# Patient Record
Sex: MALE | Race: BLACK OR AFRICAN AMERICAN | Employment: OTHER | URBAN - METROPOLITAN AREA
[De-identification: names, ages, dates, MRNs, and addresses within clinical notes are randomized per-mention and may not be internally consistent; named-entity substitution may affect disease eponyms.]

---

## 2018-03-07 ENCOUNTER — APPOINTMENT (OUTPATIENT)
Dept: CT IMAGING | Facility: CLINIC | Age: 28
End: 2018-03-07
Attending: EMERGENCY MEDICINE
Payer: COMMERCIAL

## 2018-03-07 ENCOUNTER — HOSPITAL ENCOUNTER (EMERGENCY)
Facility: CLINIC | Age: 28
Discharge: HOME | End: 2018-03-07
Attending: EMERGENCY MEDICINE | Admitting: EMERGENCY MEDICINE
Payer: COMMERCIAL

## 2018-03-07 VITALS
HEART RATE: 74 BPM | TEMPERATURE: 98 F | SYSTOLIC BLOOD PRESSURE: 103 MMHG | RESPIRATION RATE: 16 BRPM | DIASTOLIC BLOOD PRESSURE: 64 MMHG | OXYGEN SATURATION: 99 %

## 2018-03-07 DIAGNOSIS — K60.2 ANAL FISSURE: ICD-10-CM

## 2018-03-07 LAB
ALBUMIN SERPL-MCNC: 4.3 G/DL (ref 3.4–5)
ALBUMIN UR-MCNC: 10 MG/DL
ALP SERPL-CCNC: 60 U/L (ref 40–150)
ALT SERPL W P-5'-P-CCNC: 15 U/L (ref 0–70)
ANION GAP SERPL CALCULATED.3IONS-SCNC: 6 MMOL/L (ref 3–14)
APPEARANCE UR: CLEAR
AST SERPL W P-5'-P-CCNC: 14 U/L (ref 0–45)
BASOPHILS # BLD AUTO: 0 10E9/L (ref 0–0.2)
BASOPHILS NFR BLD AUTO: 0.2 %
BILIRUB SERPL-MCNC: 0.5 MG/DL (ref 0.2–1.3)
BILIRUB UR QL STRIP: NEGATIVE
BUN SERPL-MCNC: 9 MG/DL (ref 7–30)
CALCIUM SERPL-MCNC: 8.7 MG/DL (ref 8.5–10.1)
CHLORIDE SERPL-SCNC: 105 MMOL/L (ref 94–109)
CO2 SERPL-SCNC: 30 MMOL/L (ref 20–32)
COLOR UR AUTO: YELLOW
CREAT BLD-MCNC: 0.9 MG/DL (ref 0.66–1.25)
CREAT SERPL-MCNC: 0.9 MG/DL (ref 0.66–1.25)
DIFFERENTIAL METHOD BLD: NORMAL
EOSINOPHIL # BLD AUTO: 0.1 10E9/L (ref 0–0.7)
EOSINOPHIL NFR BLD AUTO: 2.9 %
ERYTHROCYTE [DISTWIDTH] IN BLOOD BY AUTOMATED COUNT: 13.6 % (ref 10–15)
GFR SERPL CREATININE-BSD FRML MDRD: >90 ML/MIN/1.7M2
GFR SERPL CREATININE-BSD FRML MDRD: >90 ML/MIN/1.7M2
GLUCOSE SERPL-MCNC: 83 MG/DL (ref 70–99)
GLUCOSE UR STRIP-MCNC: NEGATIVE MG/DL
HCT VFR BLD AUTO: 43.4 % (ref 40–53)
HGB BLD-MCNC: 14.7 G/DL (ref 13.3–17.7)
HGB UR QL STRIP: NEGATIVE
IMM GRANULOCYTES # BLD: 0 10E9/L (ref 0–0.4)
IMM GRANULOCYTES NFR BLD: 0 %
INR PPP: 1.05 (ref 0.86–1.14)
KETONES UR STRIP-MCNC: NEGATIVE MG/DL
LEUKOCYTE ESTERASE UR QL STRIP: NEGATIVE
LIPASE SERPL-CCNC: 54 U/L (ref 73–393)
LYMPHOCYTES # BLD AUTO: 1.4 10E9/L (ref 0.8–5.3)
LYMPHOCYTES NFR BLD AUTO: 32.9 %
MCH RBC QN AUTO: 28.8 PG (ref 26.5–33)
MCHC RBC AUTO-ENTMCNC: 33.9 G/DL (ref 31.5–36.5)
MCV RBC AUTO: 85 FL (ref 78–100)
MONOCYTES # BLD AUTO: 0.4 10E9/L (ref 0–1.3)
MONOCYTES NFR BLD AUTO: 10 %
MUCOUS THREADS #/AREA URNS LPF: PRESENT /LPF
NEUTROPHILS # BLD AUTO: 2.2 10E9/L (ref 1.6–8.3)
NEUTROPHILS NFR BLD AUTO: 54 %
NITRATE UR QL: NEGATIVE
NRBC # BLD AUTO: 0 10*3/UL
NRBC BLD AUTO-RTO: 0 /100
PH UR STRIP: 6 PH (ref 5–7)
PLATELET # BLD AUTO: 191 10E9/L (ref 150–450)
POTASSIUM SERPL-SCNC: 3.7 MMOL/L (ref 3.4–5.3)
PROT SERPL-MCNC: 8.2 G/DL (ref 6.8–8.8)
RBC # BLD AUTO: 5.11 10E12/L (ref 4.4–5.9)
RBC #/AREA URNS AUTO: 2 /HPF (ref 0–2)
SODIUM SERPL-SCNC: 141 MMOL/L (ref 133–144)
SOURCE: ABNORMAL
SP GR UR STRIP: 1.01 (ref 1–1.03)
UROBILINOGEN UR STRIP-MCNC: NORMAL MG/DL (ref 0–2)
WBC # BLD AUTO: 4.1 10E9/L (ref 4–11)
WBC #/AREA URNS AUTO: <1 /HPF (ref 0–5)

## 2018-03-07 PROCEDURE — 25000128 H RX IP 250 OP 636: Performed by: EMERGENCY MEDICINE

## 2018-03-07 PROCEDURE — 25000125 ZZHC RX 250: Performed by: EMERGENCY MEDICINE

## 2018-03-07 PROCEDURE — 99285 EMERGENCY DEPT VISIT HI MDM: CPT | Mod: 25 | Performed by: EMERGENCY MEDICINE

## 2018-03-07 PROCEDURE — 81001 URINALYSIS AUTO W/SCOPE: CPT | Performed by: EMERGENCY MEDICINE

## 2018-03-07 PROCEDURE — 99285 EMERGENCY DEPT VISIT HI MDM: CPT | Mod: Z6 | Performed by: EMERGENCY MEDICINE

## 2018-03-07 PROCEDURE — 82565 ASSAY OF CREATININE: CPT | Mod: 91

## 2018-03-07 PROCEDURE — 74177 CT ABD & PELVIS W/CONTRAST: CPT

## 2018-03-07 PROCEDURE — 96360 HYDRATION IV INFUSION INIT: CPT | Mod: 59 | Performed by: EMERGENCY MEDICINE

## 2018-03-07 PROCEDURE — 80053 COMPREHEN METABOLIC PANEL: CPT | Performed by: EMERGENCY MEDICINE

## 2018-03-07 PROCEDURE — 85025 COMPLETE CBC W/AUTO DIFF WBC: CPT | Performed by: EMERGENCY MEDICINE

## 2018-03-07 PROCEDURE — 83690 ASSAY OF LIPASE: CPT | Performed by: EMERGENCY MEDICINE

## 2018-03-07 PROCEDURE — 85610 PROTHROMBIN TIME: CPT | Performed by: EMERGENCY MEDICINE

## 2018-03-07 RX ORDER — IOPAMIDOL 755 MG/ML
100 INJECTION, SOLUTION INTRAVASCULAR ONCE
Status: COMPLETED | OUTPATIENT
Start: 2018-03-07 | End: 2018-03-07

## 2018-03-07 RX ORDER — NITROGLYCERIN 4 MG/G
1 OINTMENT RECTAL EVERY 12 HOURS
Qty: 30 G | Refills: 0 | Status: SHIPPED | OUTPATIENT
Start: 2018-03-07 | End: 2019-11-04

## 2018-03-07 RX ADMIN — SODIUM CHLORIDE 1000 ML: 9 INJECTION, SOLUTION INTRAVENOUS at 19:17

## 2018-03-07 RX ADMIN — IOPAMIDOL 66 ML: 755 INJECTION, SOLUTION INTRAVENOUS at 19:31

## 2018-03-07 RX ADMIN — SODIUM CHLORIDE 56 ML: 9 INJECTION, SOLUTION INTRAVENOUS at 19:31

## 2018-03-07 ASSESSMENT — ENCOUNTER SYMPTOMS
SHORTNESS OF BREATH: 0
ABDOMINAL PAIN: 1
HEADACHES: 0
BLOOD IN STOOL: 1
ARTHRALGIAS: 0
NECK STIFFNESS: 0
DIFFICULTY URINATING: 0
FEVER: 0
COLOR CHANGE: 0
CONFUSION: 0
EYE REDNESS: 0
RECTAL PAIN: 1

## 2018-03-07 NOTE — ED AVS SNAPSHOT
[facility], Emergency Department    [address]    [place] [address]    Phone:  [phone]    Fax:  [phone]                                       Scar Condon   MRN: [id number]    Department:  [facility], Emergency Department   Date of Visit:  3/7/2018           Patient Information     Date Of Birth          1990        Your diagnoses for this visit were:     Anal fissure        You were seen by Blas Cortes MD.        Discharge Instructions       Take fiber as directed.  Apply nitroglycerin ointment with a gloved finger to the anal region as directed.    Please make an appointment to follow up with [facility] (phone: [phone])      Discharge References/Attachments     KRIS BATH, TAKING A (ENGLISH)      24 Hour Appointment Hotline       To make an appointment at any [facility] clinic, call [phone] ([phone]). If you don't have a family doctor or clinic, we will help you find one. [facility] clinics are conveniently located to serve the needs of you and your family.             Review of your medicines      START taking        Dose / Directions Last dose taken    methylcellulose 500 MG Tabs tablet   Commonly known as:  CITRUCEL   Dose:  500 mg   Quantity:  30 each        Take 1 tablet (500 mg) by mouth daily   Refills:  0        nitroGLYcerin 0.4 % Oint rectal ointment   Commonly known as:  RECTIV   Dose:  1 inch   Quantity:  30 g        Place 1 inch (1.5 mg) rectally every 12 hours   Refills:  0          Our records show that you are taking the medicines listed below. If these are incorrect, please call your family doctor or clinic.        Dose / Directions Last dose taken    omeprazole 20 MG CR capsule   Commonly known as:  priLOSEC   Dose:  20 mg   Quantity:  30 capsule        Take 1 capsule (20 mg) by mouth daily   Refills:  1        sucralfate 1 GM/10ML suspension   Commonly known as:  CARAFATE   Dose:  1 g   Quantity:  420 mL        Take 10 mLs (1  g) by mouth 4 times daily   Refills:  1        traMADol 50 MG tablet   Commonly known as:  ULTRAM   Dose:   mg   Quantity:  90 tablet        Take 1-2 tablets ( mg) by mouth every 6 hours as needed for moderate pain   Refills:  0                Prescriptions were sent or printed at these locations (2 Prescriptions)                   Other Prescriptions                Printed at Department/Unit printer (2 of 2)         methylcellulose (CITRUCEL) 500 MG TABS tablet               nitroGLYcerin (RECTIV) 0.4 % OINT rectal ointment                Procedures and tests performed during your visit     CBC with platelets differential    CT Abdomen Pelvis w Contrast    Comprehensive metabolic panel    Creatinine POCT    INR    ISTAT creatinine  POCT    Lipase    Peripheral IV catheter    UA with Microscopic reflex to Culture      Orders Needing Specimen Collection     None      Pending Results     Date and Time Order Name Status Description    3/7/2018 1905 CT Abdomen Pelvis w Contrast Preliminary             Pending Culture Results     No orders found from 3/5/2018 to 3/8/2018.            Pending Results Instructions     If you had any lab results that were not finalized at the time of your Discharge, you can call the ED Lab Result RN at [phone]. You will be contacted by this team for any positive Lab results or changes in treatment. The nurses are available 7 days a week from 10A to 6:30P.  You can leave a message 24 hours per day and they will return your call.        Thank you for choosing [facility]       Thank you for choosing [facility] for your care. Our goal is always to provide you with excellent care. Hearing back from our patients is one way we can continue to improve our services. Please take a few minutes to complete the written survey that you may receive in the mail after you visit with us. Thank you!        [employer] Information     [employer] lets you send messages to your doctor, view your test  "results, renew your prescriptions, schedule appointments and more. To sign up, go to [email or web address] . Click on \"Log in\" on the left side of the screen, which will take you to the Welcome page. Then click on \"Sign up Now\" on the right side of the page.     You will be asked to enter the access code listed below, as well as some personal information. Please follow the directions to create your username and password.     Your access code is: [email or web address]  Expires: 2018  9:23 PM     Your access code will  in 90 days. If you need help or a new code, please call your [facility] clinic or [phone].        Care EveryWhere ID     This is your Care EveryWhere ID. This could be used by other organizations to access your [facility] medical records  [id number]        Equal Access to Services     RADHA JOHNSTON : Hadant Guerra, mart bess, morris zapata, lobo ordonez . So [facility] [phone].    ATENCIÓN: Si habla español, tiene a nichole disposición servicios gratuitos de asistencia lingüística. Llame al [phone].    We comply with applicable federal civil rights laws and [place] laws. We do not discriminate on the basis of race, color, national origin, age, disability, sex, sexual orientation, or gender identity.            After Visit Summary       This is your record. Keep this with you and show to your community pharmacist(s) and doctor(s) at your next visit.                  "

## 2018-03-07 NOTE — ED AVS SNAPSHOT
[facility], [place], Emergency Department    [address]    [facility] [address]    Phone:  [phone]    Fax:  [phone]                                       Scar Condon   MRN: [id number]    Department:  [facility], Emergency Department   Date of Visit:  3/7/2018           After Visit Summary Signature Page     I have received my discharge instructions, and my questions have been answered. I have discussed any challenges I see with this plan with the nurse or doctor.    ..........................................................................................................................................  Patient/Patient Representative Signature      ..........................................................................................................................................  Patient Representative Print Name and Relationship to Patient    ..................................................               ................................................  Date                                            Time    ..........................................................................................................................................  Reviewed by Signature/Title    ...................................................              ..............................................  Date                                                            Time

## 2018-03-08 NOTE — DISCHARGE INSTRUCTIONS
Take fiber as directed.  Apply nitroglycerin ointment with a gloved finger to the anal region as directed.    Please make an appointment to follow up with [facility] (phone: [phone])

## 2018-03-08 NOTE — ED PROVIDER NOTES
History     Chief Complaint   Patient presents with     Abdominal Pain     dark blood in stool: thought had blood in stool last month: stomach pain: mid ABD pain     HPI  Scar Condon is a 27 year old male who presents to the emergency department with abdominal pain and bloody stool.  Patient states that he has had upper and mid abdominal pain for the past 2 days.  He states that it has been largely constant.  He states that it is sharp.  He did have some nausea yesterday but has not had any vomiting.  Today, the patient had a formed stool with some dark blood in it as well.  He denies constipation.  He reports that his stools have been formed.  Patient states he did have some rectal pain with his most recent bowel movement here in the emergency department.  He denies any dysuria, urgency, or frequency.  He denies fever.  Patient denies any NSAID use including ibuprofen and Aleve.  Patient denies any anticoagulant use.  The patient had previously been on omeprazole but has not been taking that for some time.  He denies a history of abdominal surgery.  Patient denies any recent antibiotic use.    I have reviewed the Medications, Allergies, Past Medical and Surgical History, and Social History in the Epic system.    Review of Systems   Constitutional: Negative for fever.   HENT: Negative for congestion.    Eyes: Negative for redness.   Respiratory: Negative for shortness of breath.    Cardiovascular: Negative for chest pain.   Gastrointestinal: Positive for abdominal pain, blood in stool and rectal pain.   Genitourinary: Negative for difficulty urinating.   Musculoskeletal: Negative for arthralgias and neck stiffness.   Skin: Negative for color change.   Neurological: Negative for headaches.   Psychiatric/Behavioral: Negative for confusion.   All other systems reviewed and are negative.      Physical Exam   BP: 103/64  Pulse: 74  Temp: 98  F (36.7  C)  Resp: 16  SpO2: 99 %      Physical Exam   Constitutional: He  appears well-developed and well-nourished. No distress.   HENT:   Mouth/Throat: No oropharyngeal exudate.   Eyes: No scleral icterus.   Cardiovascular: Normal rate, regular rhythm, normal heart sounds and intact distal pulses.    Pulmonary/Chest: Effort normal and breath sounds normal. No respiratory distress.   Abdominal: Soft. Bowel sounds are normal. There is tenderness.       Genitourinary: Rectal exam shows tenderness (anterior) and guaiac positive stool (brown stool). Rectal exam shows no external hemorrhoid, no internal hemorrhoid and no mass.   Musculoskeletal: He exhibits no edema or tenderness.   Skin: Skin is warm. No rash noted. He is not diaphoretic.   Nursing note and vitals reviewed.      ED Course     ED Course     Procedures            Critical Care time:    Results for orders placed or performed during the hospital encounter of 03/07/18   CT Abdomen Pelvis w Contrast    Narrative    CT ABDOMEN AND PELVIS WITH CONTRAST 3/7/2018 7:32 PM     HISTORY: Lower abdominal pain. Evaluate for appendicitis.    COMPARISON: None.    TECHNIQUE: Volumetric helical acquisition of CT images from the lung  bases through the symphysis pubis after the administration of 66 mL  Isovue-370 intravenous contrast. Radiation dose for this scan was  reduced using automated exposure control, adjustment of the mA and/or  kV according to patient size, or iterative reconstruction technique.    FINDINGS: The bilateral kidneys and adrenal glands, pancreas, and  spleen demonstrate no worrisome focal lesion. Retrocecal appendix  unremarkable. Three probable hemangiomas seen in the liver. Liver  otherwise unremarkable. No hydronephrosis. Unremarkable gallbladder.  There is no free fluid in the abdomen or pelvis. No free air in the  abdomen. Bone windows reveal no destructive lesions. There are no  abdominal or pelvic lymph nodes that are abnormal by size criteria.  The visualized lung bases are unremarkable. There are no dilated  loops  of small bowel or colon.      Impression    IMPRESSION: No acute process demonstrated within the abdomen and  pelvis.    KEANU FORTE MD   CBC with platelets differential   Result Value Ref Range    WBC 4.1 4.0 - 11.0 10e9/L    RBC Count 5.11 4.4 - 5.9 10e12/L    Hemoglobin 14.7 13.3 - 17.7 g/dL    Hematocrit 43.4 40.0 - 53.0 %    MCV 85 78 - 100 fl    MCH 28.8 26.5 - 33.0 pg    MCHC 33.9 31.5 - 36.5 g/dL    RDW 13.6 10.0 - 15.0 %    Platelet Count 191 150 - 450 10e9/L    Diff Method Automated Method     % Neutrophils 54.0 %    % Lymphocytes 32.9 %    % Monocytes 10.0 %    % Eosinophils 2.9 %    % Basophils 0.2 %    % Immature Granulocytes 0.0 %    Nucleated RBCs 0 0 /100    Absolute Neutrophil 2.2 1.6 - 8.3 10e9/L    Absolute Lymphocytes 1.4 0.8 - 5.3 10e9/L    Absolute Monocytes 0.4 0.0 - 1.3 10e9/L    Absolute Eosinophils 0.1 0.0 - 0.7 10e9/L    Absolute Basophils 0.0 0.0 - 0.2 10e9/L    Abs Immature Granulocytes 0.0 0 - 0.4 10e9/L    Absolute Nucleated RBC 0.0    Comprehensive metabolic panel   Result Value Ref Range    Sodium 141 133 - 144 mmol/L    Potassium 3.7 3.4 - 5.3 mmol/L    Chloride 105 94 - 109 mmol/L    Carbon Dioxide 30 20 - 32 mmol/L    Anion Gap 6 3 - 14 mmol/L    Glucose 83 70 - 99 mg/dL    Urea Nitrogen 9 7 - 30 mg/dL    Creatinine 0.90 0.66 - 1.25 mg/dL    GFR Estimate >90 >60 mL/min/1.7m2    GFR Estimate If Black >90 >60 mL/min/1.7m2    Calcium 8.7 8.5 - 10.1 mg/dL    Bilirubin Total 0.5 0.2 - 1.3 mg/dL    Albumin 4.3 3.4 - 5.0 g/dL    Protein Total 8.2 6.8 - 8.8 g/dL    Alkaline Phosphatase 60 40 - 150 U/L    ALT 15 0 - 70 U/L    AST 14 0 - 45 U/L   Lipase   Result Value Ref Range    Lipase 54 (L) 73 - 393 U/L   INR   Result Value Ref Range    INR 1.05 0.86 - 1.14   UA with Microscopic reflex to Culture   Result Value Ref Range    Color Urine Yellow     Appearance Urine Clear     Glucose Urine Negative NEG^Negative mg/dL    Bilirubin Urine Negative NEG^Negative    Ketones Urine  Negative NEG^Negative mg/dL    Specific Gravity Urine 1.015 1.003 - 1.035    Blood Urine Negative NEG^Negative    pH Urine 6.0 5.0 - 7.0 pH    Protein Albumin Urine 10 (A) NEG^Negative mg/dL    Urobilinogen mg/dL Normal 0.0 - 2.0 mg/dL    Nitrite Urine Negative NEG^Negative    Leukocyte Esterase Urine Negative NEG^Negative    Source Urine     WBC Urine <1 0 - 5 /HPF    RBC Urine 2 0 - 2 /HPF    Mucous Urine Present (A) NEG^Negative /LPF   Creatinine POCT   Result Value Ref Range    Creatinine 0.9 0.66 - 1.25 mg/dL    GFR Estimate >90 >60 mL/min/1.7m2    GFR Estimate If Black >90 >60 mL/min/1.7m2         Medications   0.9% sodium chloride BOLUS (0 mLs Intravenous Stopped 3/7/18 2044)   iopamidol (ISOVUE-370) solution 100 mL (66 mLs Intravenous Given 3/7/18 1931)   sodium chloride 0.9 % bag 500mL for CT scan flush use (56 mLs As instructed Given 3/7/18 1931)             Assessments & Plan (with Medical Decision Making)   27 year old male to the emergency department with rectal bleeding as well as rectal pain.  Also complains of lower abdominal pain.  The patient has normal vital signs.  He has lower abdominal tenderness on exam without peritonitis.  The patient has guaiac positive stool without gross blood.  Laboratory evaluation was unremarkable.  CT the abdomen/pelvis is also normal.  He does have tenderness to palpation anteriorly of his anal sphincter.  I suspect his symptoms are related to an anal fissure.  He does not have any palpable swelling or fluctuance to suggest abscess.  Patient will be discharged home with Citrucel as well as nitroglycerin ointment.  Colorectal surgery follow-up recommended.    I have reviewed the nursing notes.    I have reviewed the findings, diagnosis, plan and need for follow up with the patient.    Discharge Medication List as of 3/7/2018  9:23 PM      START taking these medications    Details   methylcellulose (CITRUCEL) 500 MG TABS tablet Take 1 tablet (500 mg) by mouth daily,  Disp-30 each, R-0, Local Print      nitroGLYcerin (RECTIV) 0.4 % OINT rectal ointment Place 1 inch (1.5 mg) rectally every 12 hours, Disp-30 g, R-0, Local Print             Final diagnoses:   Anal fissure       3/7/2018   [facility], [place], EMERGENCY DEPARTMEN     Blas Cortes MD  03/07/18 9329

## 2019-02-21 ENCOUNTER — APPOINTMENT (OUTPATIENT)
Dept: ULTRASOUND IMAGING | Facility: CLINIC | Age: 29
End: 2019-02-21
Attending: EMERGENCY MEDICINE
Payer: COMMERCIAL

## 2019-02-21 ENCOUNTER — HOSPITAL ENCOUNTER (EMERGENCY)
Facility: CLINIC | Age: 29
Discharge: HOME | End: 2019-02-22
Attending: EMERGENCY MEDICINE | Admitting: EMERGENCY MEDICINE
Payer: COMMERCIAL

## 2019-02-21 DIAGNOSIS — N45.1 RIGHT EPIDIDYMITIS: ICD-10-CM

## 2019-02-21 LAB
ALBUMIN UR-MCNC: 10 MG/DL
ANION GAP SERPL CALCULATED.3IONS-SCNC: 8 MMOL/L (ref 3–14)
APPEARANCE UR: CLEAR
BASOPHILS # BLD AUTO: 0 10E9/L (ref 0–0.2)
BASOPHILS NFR BLD AUTO: 0.6 %
BILIRUB UR QL STRIP: NEGATIVE
BUN SERPL-MCNC: 18 MG/DL (ref 7–30)
CALCIUM SERPL-MCNC: 8.4 MG/DL (ref 8.5–10.1)
CHLORIDE SERPL-SCNC: 107 MMOL/L (ref 94–109)
CO2 SERPL-SCNC: 26 MMOL/L (ref 20–32)
COLOR UR AUTO: YELLOW
CREAT SERPL-MCNC: 0.89 MG/DL (ref 0.66–1.25)
DIFFERENTIAL METHOD BLD: ABNORMAL
EOSINOPHIL # BLD AUTO: 0.1 10E9/L (ref 0–0.7)
EOSINOPHIL NFR BLD AUTO: 3.1 %
ERYTHROCYTE [DISTWIDTH] IN BLOOD BY AUTOMATED COUNT: 13.6 % (ref 10–15)
GFR SERPL CREATININE-BSD FRML MDRD: >90 ML/MIN/{1.73_M2}
GLUCOSE SERPL-MCNC: 88 MG/DL (ref 70–99)
GLUCOSE UR STRIP-MCNC: NEGATIVE MG/DL
HCT VFR BLD AUTO: 41.9 % (ref 40–53)
HGB BLD-MCNC: 13.8 G/DL (ref 13.3–17.7)
HGB UR QL STRIP: NEGATIVE
IMM GRANULOCYTES # BLD: 0 10E9/L (ref 0–0.4)
IMM GRANULOCYTES NFR BLD: 0 %
KETONES UR STRIP-MCNC: NEGATIVE MG/DL
LEUKOCYTE ESTERASE UR QL STRIP: NEGATIVE
LYMPHOCYTES # BLD AUTO: 1.6 10E9/L (ref 0.8–5.3)
LYMPHOCYTES NFR BLD AUTO: 51.1 %
MCH RBC QN AUTO: 28.6 PG (ref 26.5–33)
MCHC RBC AUTO-ENTMCNC: 32.9 G/DL (ref 31.5–36.5)
MCV RBC AUTO: 87 FL (ref 78–100)
MONOCYTES # BLD AUTO: 0.5 10E9/L (ref 0–1.3)
MONOCYTES NFR BLD AUTO: 16 %
MUCOUS THREADS #/AREA URNS LPF: PRESENT /LPF
NEUTROPHILS # BLD AUTO: 0.9 10E9/L (ref 1.6–8.3)
NEUTROPHILS NFR BLD AUTO: 29.2 %
NITRATE UR QL: NEGATIVE
NRBC # BLD AUTO: 0 10*3/UL
NRBC BLD AUTO-RTO: 0 /100
PH UR STRIP: 6.5 PH (ref 5–7)
PLATELET # BLD AUTO: 180 10E9/L (ref 150–450)
POTASSIUM SERPL-SCNC: 3.9 MMOL/L (ref 3.4–5.3)
RBC # BLD AUTO: 4.82 10E12/L (ref 4.4–5.9)
RBC #/AREA URNS AUTO: 1 /HPF (ref 0–2)
SODIUM SERPL-SCNC: 141 MMOL/L (ref 133–144)
SOURCE: ABNORMAL
SP GR UR STRIP: 1.03 (ref 1–1.03)
UROBILINOGEN UR STRIP-MCNC: 2 MG/DL (ref 0–2)
WBC # BLD AUTO: 3.2 10E9/L (ref 4–11)
WBC #/AREA URNS AUTO: 1 /HPF (ref 0–5)

## 2019-02-21 PROCEDURE — 25000132 ZZH RX MED GY IP 250 OP 250 PS 637: Performed by: EMERGENCY MEDICINE

## 2019-02-21 PROCEDURE — 96372 THER/PROPH/DIAG INJ SC/IM: CPT | Performed by: EMERGENCY MEDICINE

## 2019-02-21 PROCEDURE — 99284 EMERGENCY DEPT VISIT MOD MDM: CPT | Mod: Z6 | Performed by: EMERGENCY MEDICINE

## 2019-02-21 PROCEDURE — 81001 URINALYSIS AUTO W/SCOPE: CPT | Performed by: EMERGENCY MEDICINE

## 2019-02-21 PROCEDURE — 87591 N.GONORRHOEAE DNA AMP PROB: CPT | Performed by: EMERGENCY MEDICINE

## 2019-02-21 PROCEDURE — 80048 BASIC METABOLIC PNL TOTAL CA: CPT | Performed by: EMERGENCY MEDICINE

## 2019-02-21 PROCEDURE — 87491 CHLMYD TRACH DNA AMP PROBE: CPT | Performed by: EMERGENCY MEDICINE

## 2019-02-21 PROCEDURE — 99284 EMERGENCY DEPT VISIT MOD MDM: CPT | Mod: 25 | Performed by: EMERGENCY MEDICINE

## 2019-02-21 PROCEDURE — 93976 VASCULAR STUDY: CPT

## 2019-02-21 PROCEDURE — 85025 COMPLETE CBC W/AUTO DIFF WBC: CPT | Performed by: EMERGENCY MEDICINE

## 2019-02-21 RX ORDER — OXYCODONE HYDROCHLORIDE 5 MG/1
5 TABLET ORAL ONCE
Status: COMPLETED | OUTPATIENT
Start: 2019-02-21 | End: 2019-02-21

## 2019-02-21 RX ADMIN — OXYCODONE HYDROCHLORIDE 5 MG: 5 TABLET ORAL at 23:27

## 2019-02-21 ASSESSMENT — ENCOUNTER SYMPTOMS
NAUSEA: 0
VOMITING: 0
BACK PAIN: 1
ABDOMINAL PAIN: 0
CONSTIPATION: 1
WOUND: 0
FREQUENCY: 0
HEMATURIA: 0
DYSURIA: 0
BLOOD IN STOOL: 0
FEVER: 0
DIARRHEA: 0
FLANK PAIN: 0
DIFFICULTY URINATING: 0

## 2019-02-21 NOTE — ED AVS SNAPSHOT
[facility], [place], Emergency Department  [address]  [facility] [address]  Phone:  [phone]  Fax:  [phone]                                    Scar Condon   MRN: [id number]    Department:  [facility], Emergency Department   Date of Visit:  2/21/2019           After Visit Summary Signature Page    I have received my discharge instructions, and my questions have been answered. I have discussed any challenges I see with this plan with the nurse or doctor.    ..........................................................................................................................................  Patient/Patient Representative Signature      ..........................................................................................................................................  Patient Representative Print Name and Relationship to Patient    ..................................................               ................................................  Date                                   Time    ..........................................................................................................................................  Reviewed by Signature/Title    ...................................................              ..............................................  Date                                               Time          22EPIC Rev 08/18

## 2019-02-22 VITALS
DIASTOLIC BLOOD PRESSURE: 73 MMHG | RESPIRATION RATE: 16 BRPM | SYSTOLIC BLOOD PRESSURE: 119 MMHG | TEMPERATURE: 97.6 F | OXYGEN SATURATION: 99 % | BODY MASS INDEX: 20.07 KG/M2 | WEIGHT: 132 LBS | HEART RATE: 65 BPM

## 2019-02-22 LAB
C TRACH DNA SPEC QL NAA+PROBE: NEGATIVE
N GONORRHOEA DNA SPEC QL NAA+PROBE: NEGATIVE
SPECIMEN SOURCE: NORMAL
SPECIMEN SOURCE: NORMAL

## 2019-02-22 PROCEDURE — 25000132 ZZH RX MED GY IP 250 OP 250 PS 637: Performed by: EMERGENCY MEDICINE

## 2019-02-22 PROCEDURE — 25000128 H RX IP 250 OP 636: Performed by: EMERGENCY MEDICINE

## 2019-02-22 RX ORDER — LEVOFLOXACIN 500 MG/1
500 TABLET, FILM COATED ORAL DAILY
Qty: 10 TABLET | Refills: 0 | Status: SHIPPED | OUTPATIENT
Start: 2019-02-22 | End: 2019-03-04

## 2019-02-22 RX ORDER — OXYCODONE HYDROCHLORIDE 5 MG/1
5 TABLET ORAL EVERY 6 HOURS PRN
Qty: 5 TABLET | Refills: 0 | Status: SHIPPED | OUTPATIENT
Start: 2019-02-22 | End: 2019-02-25

## 2019-02-22 RX ORDER — CEFTRIAXONE SODIUM 250 MG
250 VIAL (EA) INJECTION ONCE
Status: COMPLETED | OUTPATIENT
Start: 2019-02-22 | End: 2019-02-22

## 2019-02-22 RX ORDER — LEVOFLOXACIN 500 MG/1
500 TABLET, FILM COATED ORAL ONCE
Status: COMPLETED | OUTPATIENT
Start: 2019-02-22 | End: 2019-02-22

## 2019-02-22 RX ADMIN — CEFTRIAXONE SODIUM 250 MG: 250 INJECTION, POWDER, FOR SOLUTION INTRAMUSCULAR; INTRAVENOUS at 00:34

## 2019-02-22 RX ADMIN — LEVOFLOXACIN 500 MG: 500 TABLET, FILM COATED ORAL at 00:34

## 2019-02-22 NOTE — ED NOTES
Patient states having scrotal pain for the past week. Patient states having a vasectomy a year ago and states he has had pain off and on but states the past week has been worse. Patient states no discharge and states having no pain with urination.

## 2019-02-22 NOTE — ED PROVIDER NOTES
History     Chief Complaint   Patient presents with     Testicular/scrotal Pain     Onset 4 days with testicular pain similar to pain after vestectomy one year ago.     GINI Condon is a 28 year old male who is status post vasectomy presents to the Emergency Department today for evaluation of right testicular pain. The patient states that on Monday, 3 days ago, he developed pain in his right testicle. This pain is noted to be constant and is exacerbated with movement and sitting down. The pain also radiates into the lower right quadrant of his abdomen. He also states that it feels like his scrotum is slightly swollen. The patient denies having any genital sores, dysuria, or penile discharge. No fever, nausea, vomiting, diarrhea, constipation, back pain.  He is sexually active with one female partner, his wife.  He is not concerned about potential STDs.  Reports he had similar pain after heavy lifting following his vasectomy.  However, this involved his whole scrotum.      I have reviewed the Medications, Allergies, Past Medical and Surgical History, and Social History in the Epic system.    History reviewed. No pertinent past medical history.    History reviewed. No pertinent surgical history.    Family History   Problem Relation Age of Onset     Cancer Maternal Uncle      Cancer Maternal Aunt      Social History     Tobacco Use     Smoking status: Current Every Day Smoker     Types: Cigarettes     Smokeless tobacco: Never Used   Substance Use Topics     Alcohol use: Yes     No current facility-administered medications for this encounter.      Current Outpatient Medications   Medication     levofloxacin (LEVAQUIN) 500 MG tablet     oxyCODONE (ROXICODONE) 5 MG tablet     methylcellulose (CITRUCEL) 500 MG TABS tablet     nitroGLYcerin (RECTIV) 0.4 % OINT rectal ointment     omeprazole (PRILOSEC) 20 MG capsule     sucralfate (CARAFATE) 1 GM/10ML suspension     traMADol (ULTRAM) 50 MG tablet     Allergies    Allergen Reactions     Codeine Hives     Naproxen      Pt states that it made his stomach feel different, numbness and tingling.       Review of Systems   Constitutional: Negative for fever.   Gastrointestinal: Positive for constipation (chronic unchanged). Negative for abdominal pain (RLQ radiating from right testicle), blood in stool, diarrhea, nausea and vomiting.   Genitourinary: Positive for scrotal swelling and testicular pain (right). Negative for decreased urine volume, difficulty urinating, discharge, dysuria, flank pain, frequency, genital sores, hematuria, penile pain and penile swelling.   Musculoskeletal: Positive for back pain (chronic unchanged).   Skin: Negative for rash and wound.   All other systems reviewed and are negative.    Physical Exam   BP: 117/78  Pulse: 70  Heart Rate: 70  Temp: 97.4  F (36.3  C)  Resp: 16  Weight: 59.9 kg (132 lb)  SpO2: 100 %    Physical Exam   Constitutional: He is oriented to person, place, and time. He appears well-developed and well-nourished. No distress.   HENT:   Head: Normocephalic and atraumatic.   Nose: Nose normal.   Mouth/Throat: Oropharynx is clear and moist.   Eyes: Conjunctivae are normal. No scleral icterus.   Neck: Normal range of motion. Neck supple.   Cardiovascular: Normal rate.   Pulmonary/Chest: Effort normal. No respiratory distress.   Abdominal: Soft. He exhibits no distension and no mass. There is no tenderness. There is no rebound and no guarding. No hernia. Hernia confirmed negative in the right inguinal area and confirmed negative in the left inguinal area.   Genitourinary: Penis normal. Right testis shows tenderness (over epididymis). Right testis shows no mass and no swelling. Right testis is descended. Left testis shows no mass, no swelling and no tenderness. Left testis is descended. Circumcised. No penile erythema or penile tenderness. No discharge found.   Musculoskeletal: Normal range of motion. He exhibits no edema or deformity.    Lymphadenopathy: No inguinal adenopathy noted on the right or left side.   Neurological: He is alert and oriented to person, place, and time. He exhibits normal muscle tone.   Skin: Skin is warm and dry. No rash noted. He is not diaphoretic. No erythema. No pallor.   Psychiatric: He has a normal mood and affect. His behavior is normal. Thought content normal.   Nursing note and vitals reviewed.      ED Course   10:35 PM  The patient was seen and examined by Dr. Bailey in Room 20.       Procedures             Critical Care time:  none             Labs Ordered and Resulted from Time of ED Arrival Up to the Time of Departure from the ED   ROUTINE UA WITH MICROSCOPIC REFLEX TO CULTURE - Abnormal; Notable for the following components:       Result Value    Protein Albumin Urine 10 (*)     Mucous Urine Present (*)     All other components within normal limits   CBC WITH PLATELETS DIFFERENTIAL - Abnormal; Notable for the following components:    WBC 3.2 (*)     Absolute Neutrophil 0.9 (*)     All other components within normal limits   BASIC METABOLIC PANEL - Abnormal; Notable for the following components:    Calcium 8.4 (*)     All other components within normal limits   CHLAMYDIA TRACHOMATIS PCR   NEISSERIA GONORRHOEAE PCR            Assessments & Plan (with Medical Decision Making)   Scar Condon is a 28 year old male who is status post vasectomy presents to the Emergency Department today for evaluation of right testicular pain.     Ddx: Right epididymitis/epididymoorchitis, gonorrhea/chlamydia, E. coli, UTI, varicocele, hydrocele    UA within normal limits.  No evidence of infection.  Urine gonorrhea and chlamydia sent.  Patient denies significant risk factors for STD.  Given oxycodone for pain.  Sent for testicular ultrasound.    Tolerated oxycodone well without any evidence of allergic reaction.  Testicular ultrasound resulted with right epididymitis.  Given p.o. levofloxacin and intramuscular ceftriaxone.  He  was discharged with a prescription for levofloxacin.  Still complaining of significant pain.  He was also provided with a small prescription for oxycodone.  He has a history of bleeding gastric ulcers so he was advised to take Tylenol for moderate to mild pain rather than ibuprofen.  I confirmed, he is no longer taking sucralfate so there should be no interference with the efficacy of his antibiotics.  I recommended the patient follow-up with his primary care doctor or urologist within 1 week.  Return precautions provided.    I have reviewed the nursing notes.    I have reviewed the findings, diagnosis, plan and need for follow up with the patient.     Medication List      Started    levofloxacin 500 MG tablet  Commonly known as:  LEVAQUIN  500 mg, Oral, DAILY     oxyCODONE 5 MG tablet  Commonly known as:  ROXICODONE  5 mg, Oral, EVERY 6 HOURS PRN            Final diagnoses:   Right epididymitis   IFrandy, am serving as a trained medical scribe to document services personally performed by Argelia Bailey MD, based on the provider's statements to me.   Argelia BLANTON MD, was physically present and have reviewed and verified the accuracy of this note documented by Frandy Deng.      2/21/2019   [facility], [place], EMERGENCY DEPARTMENT     Argelia Bailey MD  02/22/19 0129

## 2019-02-22 NOTE — DISCHARGE INSTRUCTIONS
Please make an appointment to follow up with Your Primary Care Provider or Urology Clinic (phone: [phone]) in 7 days.    Take levofloxacin as prescribed for 10 days.  If you are concerned that she may have a sexually transmitted infection, you should abstain from sexual intercourse until you have completed treatment and are asymptomatic.  You should also consider having her partner seek testing.  Wear supportive underwear to help with pain.      Take Tylenol, as needed for pain relief.  Take oxycodone for severe pain, as needed.  Do not drive or drink alcohol while taking this medication. This is a sedating drug and may cause you to be off balance and at risk for falling especially when taken with other sedating medications. Use only as needed, and with caution.     Return to the emergency department if you develop worsening pain, redness, swelling, blood in your urine, fever, inability to urinate.

## 2019-11-04 ENCOUNTER — OFFICE VISIT (OUTPATIENT)
Dept: FAMILY MEDICINE | Facility: CLINIC | Age: 29
End: 2019-11-04
Payer: COMMERCIAL

## 2019-11-04 VITALS
HEIGHT: 68 IN | TEMPERATURE: 97.9 F | OXYGEN SATURATION: 98 % | RESPIRATION RATE: 16 BRPM | DIASTOLIC BLOOD PRESSURE: 63 MMHG | BODY MASS INDEX: 20.87 KG/M2 | HEART RATE: 66 BPM | WEIGHT: 137.7 LBS | SYSTOLIC BLOOD PRESSURE: 112 MMHG

## 2019-11-04 DIAGNOSIS — S62.92XD CLOSED FRACTURE OF LEFT HAND WITH ROUTINE HEALING, SUBSEQUENT ENCOUNTER: Primary | ICD-10-CM

## 2019-11-04 PROCEDURE — 99203 OFFICE O/P NEW LOW 30 MIN: CPT | Performed by: NURSE PRACTITIONER

## 2019-11-04 RX ORDER — OXYCODONE HYDROCHLORIDE 5 MG/1
5 TABLET ORAL 2 TIMES DAILY PRN
COMMUNITY
Start: 2019-10-27 | End: 2019-11-04

## 2019-11-04 RX ORDER — OXYCODONE HYDROCHLORIDE 5 MG/1
5 TABLET ORAL 2 TIMES DAILY PRN
Qty: 14 TABLET | Refills: 0 | Status: SHIPPED | OUTPATIENT
Start: 2019-11-04

## 2019-11-04 ASSESSMENT — MIFFLIN-ST. JEOR: SCORE: 1564.1

## 2019-11-04 NOTE — PATIENT INSTRUCTIONS
Return to see if they can put a cast on, I refilled the pain medicine today, have that appointment with the specialist if you do feel you need more.

## 2019-11-04 NOTE — PROGRESS NOTES
Subjective     Scar Condon is a 28 year old male who presents to clinic today for the following health issues:    HPI   Musculoskeletal problem/pain      Duration: 10/26/2019      Description  Location: left hand    Intensity:  9/10    Accompanying signs and symptoms: radiation of pain to wrist, swelling    History  Previous similar problem: no   Previous evaluation:  Yes, saw in the [facility] ER on the 10/27/19, saw specialist on 10/31, also had an xray done in ER    Precipitating or alleviating factors:  Trauma or overuse: no   Aggravating factors include: lifting and movement, if arm is down it will hurt, pressure    Therapies tried and outcome: heat, acetaminophen and percocet - helped just a little still had pain    [facility] note from 10/26 reviewed    Reviewed specialist note from 10/31- pt was told if this does not stablize enough he should return there to have a cast made. Was trying not to and using wrist brace he thought would be better for caring for his 4 kids, etc. Casts kind of gross him out, wife does think he needs the cast     Needing something for pain, had small amt that he used up, last injury/use of opioid 5 years ago with his last fx . Cannot tolerate Ibuprofen, was tolerating the 5 mg oxy. Also reports tylenol makes him sleepy. Reports no concerns with any substance abuse    checked and last Rx as expected without any concerning activity.     Establishing care, needs Primary Care Provider   Has been a while since had a physical       Patient Active Problem List   Diagnosis     Tobacco dependence syndrome     Scalp cyst     Impingement syndrome of shoulder region, left     Pain in joint, upper arm, left     Limitation of joint motion of shoulder, left     Weakness of shoulder     History reviewed. No pertinent surgical history.    Social History     Tobacco Use     Smoking status: Current Every Day Smoker     Packs/day: 0.00     Types: Cigarettes     Smokeless tobacco: Never Used  "  Substance Use Topics     Alcohol use: Yes     Family History   Problem Relation Age of Onset     Cancer Mother      Colon Cancer Father      Cancer Maternal Uncle      Cancer Maternal Aunt          Current Outpatient Medications   Medication Sig Dispense Refill     oxyCODONE (ROXICODONE) 5 MG tablet Take 1 tablet (5 mg) by mouth 2 times daily as needed for severe pain 14 tablet 0     Allergies   Allergen Reactions     Codeine Hives     Naproxen      Pt states that it made his stomach feel different, numbness and tingling.        Reviewed and updated as needed this visit by Provider         Review of Systems   ROS COMP: Constitutional, skin, respiratory, cardiac, GI, MSK, neuro, and allergy are normal except as otherwise noted.       Objective    /63 (BP Location: Left arm, Patient Position: Sitting, Cuff Size: Adult Regular)   Pulse 66   Temp 97.9  F (36.6  C) (Oral)   Resp 16   Ht 1.727 m (5' 8\")   Wt 62.5 kg (137 lb 11.2 oz)   SpO2 98%   BMI 20.94 kg/m    Body mass index is 20.94 kg/m .  Physical Exam   GENERAL: healthy, alert and no distress  EYES: Eyes grossly normal to inspection, PERRL and conjunctivae and sclerae normal  RESP: Respiratory rate regular and breathing easily   CV: No abnormal color and extremities warm, normal pulses in wrist and cap refill normal   MS: wearing splint left wrist   SKIN: no suspicious lesions or rashes  NEURO: Normal strength and tone, mentation intact and speech normal  PSYCH: mentation appears normal, affect normal/bright    Diagnostic Test Results:  Labs reviewed in Epic        Assessment & Plan       ICD-10-CM    1. Closed fracture of left hand with routine healing, subsequent encounter S62.92XD oxyCODONE (ROXICODONE) 5 MG tablet   reviewed specailist note, agree he should return for new cast to stablize the hand and wrist, he has number to call them and set this up  Reasonable to refill pain med for one more week to hold him over, reviewed safe use and " storage. If he is needing any more after this will need to check with specialist or perhaps pain clinic if ongoing issue     Tobacco Cessation:   reports that he has been smoking cigarettes. He has been smoking about 0.00 packs per day. He has never used smokeless tobacco.          Patient Instructions   Return to see if they can put a cast on, I refilled the pain medicine today, have that appointment with the specialist if you do feel you need more.           Return for next annual exam or as needed.    VALENTINA Reed CNP  [facility] [place]